# Patient Record
Sex: FEMALE | Race: WHITE
[De-identification: names, ages, dates, MRNs, and addresses within clinical notes are randomized per-mention and may not be internally consistent; named-entity substitution may affect disease eponyms.]

---

## 2021-09-01 ENCOUNTER — OFFICE VISIT (OUTPATIENT)
Age: 76
End: 2021-09-01

## 2021-10-01 ENCOUNTER — OFFICE VISIT (OUTPATIENT)
Age: 76
End: 2021-10-01

## 2021-10-21 ENCOUNTER — OFFICE VISIT (OUTPATIENT)
Dept: URBAN - METROPOLITAN AREA CLINIC 7 | Facility: CLINIC | Age: 76
End: 2021-10-21

## 2021-11-19 ENCOUNTER — OFFICE VISIT (OUTPATIENT)
Dept: URBAN - METROPOLITAN AREA CLINIC 7 | Facility: CLINIC | Age: 76
End: 2021-11-19

## 2021-11-19 ENCOUNTER — TELEPHONE ENCOUNTER (OUTPATIENT)
Dept: URBAN - METROPOLITAN AREA CLINIC 9 | Facility: CLINIC | Age: 76
End: 2021-11-19

## 2021-12-03 ENCOUNTER — TELEPHONE ENCOUNTER (OUTPATIENT)
Dept: URBAN - METROPOLITAN AREA CLINIC 9 | Facility: CLINIC | Age: 76
End: 2021-12-03

## 2022-01-04 ENCOUNTER — OFFICE VISIT (OUTPATIENT)
Dept: URBAN - METROPOLITAN AREA SURGERY CENTER 5 | Facility: SURGERY CENTER | Age: 77
End: 2022-01-04

## 2022-06-14 ENCOUNTER — OFFICE VISIT (OUTPATIENT)
Dept: URBAN - METROPOLITAN AREA CLINIC 7 | Facility: CLINIC | Age: 77
End: 2022-06-14

## 2022-06-27 ENCOUNTER — TELEPHONE ENCOUNTER (OUTPATIENT)
Dept: URBAN - METROPOLITAN AREA CLINIC 9 | Facility: CLINIC | Age: 77
End: 2022-06-27

## 2022-07-06 ENCOUNTER — TELEPHONE ENCOUNTER (OUTPATIENT)
Dept: URBAN - METROPOLITAN AREA CLINIC 9 | Facility: CLINIC | Age: 77
End: 2022-07-06

## 2022-07-08 ENCOUNTER — OFFICE VISIT (OUTPATIENT)
Dept: URBAN - METROPOLITAN AREA SURGERY CENTER 5 | Facility: SURGERY CENTER | Age: 77
End: 2022-07-08

## 2022-07-09 ENCOUNTER — TELEPHONE ENCOUNTER (OUTPATIENT)
Dept: URBAN - METROPOLITAN AREA CLINIC 121 | Facility: CLINIC | Age: 77
End: 2022-07-09

## 2022-07-10 ENCOUNTER — TELEPHONE ENCOUNTER (OUTPATIENT)
Dept: URBAN - METROPOLITAN AREA CLINIC 121 | Facility: CLINIC | Age: 77
End: 2022-07-10

## 2022-07-30 ENCOUNTER — TELEPHONE ENCOUNTER (OUTPATIENT)
Age: 77
End: 2022-07-30

## 2022-07-30 RX ORDER — MESALAMINE 1.2 G/1
3 (THREE) TABLET DR TABLET, DELAYED RELEASE ORAL DAILY
Qty: 0 | Refills: 5 | OUTPATIENT
Start: 2016-03-03 | End: 2017-01-04

## 2022-07-30 RX ORDER — BUDESONIDE 3 MG/1
1 CAPSULE, COATED PELLETS ORAL
Qty: 0 | Refills: 2 | OUTPATIENT
Start: 2020-04-01 | End: 2020-05-27

## 2022-07-30 RX ORDER — MESALAMINE 800 MG/1
1 (ONE) TABLET, DELAYED RELEASE ORAL
Qty: 0 | Refills: 16 | OUTPATIENT
Start: 2012-06-25 | End: 2012-06-26

## 2022-07-30 RX ORDER — FAMOTIDINE 10 MG
1 TABLET ORAL
Qty: 0 | Refills: 5 | OUTPATIENT
Start: 2018-12-10 | End: 2020-01-16

## 2022-07-31 ENCOUNTER — TELEPHONE ENCOUNTER (OUTPATIENT)
Age: 77
End: 2022-07-31

## 2022-07-31 RX ORDER — FAMOTIDINE 10 MG
1 (ONE) TABLET ORAL
Qty: 0 | Refills: 12 | Status: ACTIVE | COMMUNITY
Start: 2017-02-02

## 2022-07-31 RX ORDER — FAMOTIDINE 10 MG
1 (ONE) TABLET ORAL
Qty: 0 | Refills: 2 | Status: ACTIVE | COMMUNITY
Start: 2017-01-04

## 2022-07-31 RX ORDER — FAMOTIDINE 10 MG
1 TABLET ORAL
Qty: 0 | Refills: 5 | Status: ACTIVE | COMMUNITY
Start: 2018-12-10

## 2022-07-31 RX ORDER — BUDESONIDE 3 MG/1
1 CAPSULE, COATED PELLETS ORAL
Qty: 0 | Refills: 2 | Status: ACTIVE | COMMUNITY
Start: 2020-04-01

## 2022-07-31 RX ORDER — MESALAMINE 800 MG/1
1 (ONE) TABLET, DELAYED RELEASE ORAL
Qty: 0 | Refills: 16 | Status: ACTIVE | COMMUNITY
Start: 2012-06-25

## 2022-07-31 RX ORDER — FAMOTIDINE 10 MG
1 (ONE) TABLET ORAL
Qty: 0 | Refills: 2 | Status: ACTIVE | COMMUNITY
Start: 2018-12-03

## 2022-07-31 RX ORDER — FAMOTIDINE 10 MG
1 (ONE) TABLET ORAL
Qty: 0 | Refills: 2 | Status: ACTIVE | COMMUNITY
Start: 2018-11-08

## 2022-07-31 RX ORDER — BISMUTH SUBSALICYLATE 262 MG/1
1 (ONE) TABLET, CHEWABLE ORAL
Qty: 0 | Refills: 5 | Status: ACTIVE | COMMUNITY
Start: 2020-04-09

## 2022-07-31 RX ORDER — FAMOTIDINE 10 MG
1 (ONE) TABLET ORAL
Qty: 0 | Refills: 2 | Status: ACTIVE | COMMUNITY
Start: 2018-12-04

## 2022-07-31 RX ORDER — FAMOTIDINE 10 MG
1 (ONE) TABLET ORAL
Qty: 0 | Refills: 4 | Status: ACTIVE | COMMUNITY
Start: 2018-02-19

## 2022-07-31 RX ORDER — MESALAMINE 1.2 G/1
3 (THREE) TABLET, DELAYED RELEASE ORAL DAILY
Qty: 0 | Refills: 5 | Status: ACTIVE | COMMUNITY
Start: 2016-03-03

## 2022-08-16 ENCOUNTER — OFFICE VISIT (OUTPATIENT)
Dept: URBAN - METROPOLITAN AREA SURGERY CENTER 5 | Facility: SURGERY CENTER | Age: 77
End: 2022-08-16
Payer: MEDICARE

## 2022-08-16 ENCOUNTER — CLAIMS CREATED FROM THE CLAIM WINDOW (OUTPATIENT)
Dept: URBAN - METROPOLITAN AREA CLINIC 8 | Facility: CLINIC | Age: 77
End: 2022-08-16
Payer: MEDICARE

## 2022-08-16 DIAGNOSIS — R19.7 ACUTE DIARRHEA: ICD-10-CM

## 2022-08-16 DIAGNOSIS — K64.0 BLEEDING GRADE I HEMORRHOIDS: ICD-10-CM

## 2022-08-16 DIAGNOSIS — K63.5 BENIGN COLON POLYP: ICD-10-CM

## 2022-08-16 DIAGNOSIS — K52.831 CC (COLLAGENOUS COLITIS): ICD-10-CM

## 2022-08-16 PROCEDURE — 88305 TISSUE EXAM BY PATHOLOGIST: CPT | Performed by: INTERNAL MEDICINE

## 2022-08-16 PROCEDURE — 45380 COLONOSCOPY AND BIOPSY: CPT | Performed by: CLINIC/CENTER

## 2022-08-16 PROCEDURE — 45385 COLONOSCOPY W/LESION REMOVAL: CPT | Performed by: CLINIC/CENTER

## 2022-08-16 PROCEDURE — 45385 COLONOSCOPY W/LESION REMOVAL: CPT | Performed by: INTERNAL MEDICINE

## 2022-08-16 PROCEDURE — 45380 COLONOSCOPY AND BIOPSY: CPT | Performed by: INTERNAL MEDICINE

## 2022-09-26 ENCOUNTER — TELEPHONE ENCOUNTER (OUTPATIENT)
Dept: URBAN - METROPOLITAN AREA CLINIC 7 | Facility: CLINIC | Age: 77
End: 2022-09-26

## 2022-11-04 ENCOUNTER — OFFICE VISIT (OUTPATIENT)
Dept: URBAN - METROPOLITAN AREA CLINIC 7 | Facility: CLINIC | Age: 77
End: 2022-11-04
Payer: MEDICARE

## 2022-11-04 VITALS
DIASTOLIC BLOOD PRESSURE: 82 MMHG | WEIGHT: 109 LBS | TEMPERATURE: 97.7 F | BODY MASS INDEX: 20.06 KG/M2 | HEIGHT: 62 IN | SYSTOLIC BLOOD PRESSURE: 128 MMHG | RESPIRATION RATE: 16 BRPM

## 2022-11-04 DIAGNOSIS — R19.7 DIARRHEA, UNSPECIFIED TYPE: ICD-10-CM

## 2022-11-04 PROCEDURE — 99213 OFFICE O/P EST LOW 20 MIN: CPT | Performed by: STUDENT IN AN ORGANIZED HEALTH CARE EDUCATION/TRAINING PROGRAM

## 2022-11-04 RX ORDER — FLUTICASONE PROPIONATE 50 UG/1
SHAKE LIQUID AND USE 1 SPRAY IN EACH NOSTRIL EVERY DAY SPRAY, METERED NASAL
Qty: 16 GRAM | Refills: 0 | Status: ACTIVE | COMMUNITY

## 2022-11-04 RX ORDER — CITALOPRAM HYDROBROMIDE 10 MG/1
TAKE 1 TABLET BY MOUTH EVERY DAY TABLET ORAL
Qty: 30 EACH | Refills: 0 | Status: ACTIVE | COMMUNITY

## 2022-11-04 RX ORDER — BISMUTH SUBSALICYLATE 262 MG/1
1 (ONE) TABLET, CHEWABLE ORAL
Qty: 0 | Refills: 5 | Status: DISCONTINUED | COMMUNITY
Start: 2020-04-09

## 2022-11-04 RX ORDER — MESALAMINE 1.2 G/1
3 (THREE) TABLET, DELAYED RELEASE ORAL DAILY
Qty: 0 | Refills: 5 | Status: DISCONTINUED | COMMUNITY
Start: 2016-03-03

## 2022-11-04 RX ORDER — CHOLESTYRAMINE 4 G/9G
1 PACKET MIXED WITH WATER OR NON-CARBONATED DRINK POWDER, FOR SUSPENSION ORAL ONCE A DAY
Qty: 30 | Refills: 2 | OUTPATIENT
Start: 2022-11-04

## 2022-11-04 RX ORDER — INFLIXIMAB 100 MG/10ML
AS DIRECTED INJECTION, POWDER, LYOPHILIZED, FOR SOLUTION INTRAVENOUS
Status: ACTIVE | COMMUNITY
Start: 2022-11-04

## 2022-11-04 RX ORDER — VALACYCLOVIR HYDROCHLORIDE 500 MG/1
TABLET, FILM COATED ORAL
Qty: 30 TABLET | Status: ACTIVE | COMMUNITY

## 2022-11-04 RX ORDER — BUDESONIDE 3 MG/1
1 CAPSULE, COATED PELLETS ORAL
Qty: 0 | Refills: 2 | Status: DISCONTINUED | COMMUNITY
Start: 2020-04-01

## 2022-11-04 RX ORDER — MESALAMINE 800 MG/1
1 (ONE) TABLET, DELAYED RELEASE ORAL
Qty: 0 | Refills: 16 | Status: DISCONTINUED | COMMUNITY
Start: 2012-06-25

## 2022-11-04 RX ORDER — FAMOTIDINE 10 MG
1 (ONE) TABLET ORAL
Qty: 0 | Refills: 2 | Status: DISCONTINUED | COMMUNITY
Start: 2018-12-04

## 2022-11-04 NOTE — HPI-TODAY'S VISIT:
Patient has a history of microscopic colitis (dx in 2012), psoriatic arthritis on remicade, who presents for follow up.  GI Hx: Still having non bloody diarrhea (up to 4-5x/day) and is taking antidiarrheals PRN to perform her normal daily lifestyle. Is taking alot of supplements/vitamins. No NSAIDs. Denies weight loss, fever, chills, abdominal pain, nausea, vomiting. Denies dysphagia, reflux, UGI symptoms. Denies hematemesis, melena, hematochezia, blood per rectum.  EGD: 1/4/22- z line irregular (bx: minimal chronic inflam, neg IM/dys), mild schatskis ring DIL to 45 Fr, gastritis (bx: mild acute on chronic infl, neg HP), small HH, normal duodenum 2017- gastritis (bx: non specific infl). 2016- irregular stomach lining (bx distal and proximal- chronic gastirtis) EUS 2015- normal. 2015- congested erythematous and nodular mucosa of gastric body and antrum (bx: moderate chronic inflammation with focal ulceration and ischemic change) 2015- gastritis (bx from antrum/body/fundus: chronic active gastritis)  Colonoscopy: 8/16/22- 3mm polyp in desc (bx: HP), random colon bx normal. IH. 3/2020- EH, IH, sigmoid and desc divertic, random colon bx normal. 2017- IH, random colon bx normal. 2012- IH, random colon bx consistent with collagneous colitis.  Imaging/Studies/Procedures: 6/2022- labs- c diff negative, elastase 771, O/P normal, TTG normal. TSH normal.

## 2022-11-22 ENCOUNTER — TELEPHONE ENCOUNTER (OUTPATIENT)
Dept: URBAN - METROPOLITAN AREA CLINIC 7 | Facility: CLINIC | Age: 77
End: 2022-11-22

## 2022-12-16 ENCOUNTER — OFFICE VISIT (OUTPATIENT)
Dept: URBAN - METROPOLITAN AREA CLINIC 7 | Facility: CLINIC | Age: 77
End: 2022-12-16
Payer: MEDICARE

## 2022-12-16 VITALS
BODY MASS INDEX: 20.24 KG/M2 | DIASTOLIC BLOOD PRESSURE: 74 MMHG | SYSTOLIC BLOOD PRESSURE: 128 MMHG | TEMPERATURE: 97.8 F | WEIGHT: 110 LBS | RESPIRATION RATE: 16 BRPM | HEIGHT: 62 IN

## 2022-12-16 DIAGNOSIS — R19.7 DIARRHEA, UNSPECIFIED TYPE: ICD-10-CM

## 2022-12-16 DIAGNOSIS — R19.5 ELEVATED FECAL CALPROTECTIN: ICD-10-CM

## 2022-12-16 PROCEDURE — 99213 OFFICE O/P EST LOW 20 MIN: CPT | Performed by: STUDENT IN AN ORGANIZED HEALTH CARE EDUCATION/TRAINING PROGRAM

## 2022-12-16 RX ORDER — INFLIXIMAB 100 MG/10ML
AS DIRECTED INJECTION, POWDER, LYOPHILIZED, FOR SOLUTION INTRAVENOUS
Status: ACTIVE | COMMUNITY
Start: 2022-11-04

## 2022-12-16 RX ORDER — VALACYCLOVIR HYDROCHLORIDE 500 MG/1
TABLET, FILM COATED ORAL
Qty: 30 TABLET | Status: ACTIVE | COMMUNITY

## 2022-12-16 RX ORDER — PSYLLIUM SEED (WITH DEXTROSE)
1 PACKET WITH 8 OUNCES OF LIQUID AS NEEDED POWDER (GRAM) ORAL TWICE A DAY
Qty: 60 | Refills: 0 | OUTPATIENT
Start: 2022-12-16 | End: 2023-01-15

## 2022-12-16 RX ORDER — CHOLESTYRAMINE 4 G/9G
1 PACKET MIXED WITH WATER OR NON-CARBONATED DRINK POWDER, FOR SUSPENSION ORAL TWICE A DAY
Qty: 60 | Refills: 2 | OUTPATIENT
Start: 2022-11-04

## 2022-12-16 RX ORDER — CHOLESTYRAMINE 4 G/9G
1 PACKET MIXED WITH WATER OR NON-CARBONATED DRINK POWDER, FOR SUSPENSION ORAL ONCE A DAY
Qty: 30 | Refills: 2 | Status: ACTIVE | COMMUNITY
Start: 2022-11-04

## 2022-12-16 RX ORDER — CITALOPRAM HYDROBROMIDE 10 MG/1
TAKE 1 TABLET BY MOUTH EVERY DAY TABLET ORAL
Qty: 30 EACH | Refills: 0 | Status: ACTIVE | COMMUNITY

## 2022-12-16 RX ORDER — FLUTICASONE PROPIONATE 50 UG/1
SHAKE LIQUID AND USE 1 SPRAY IN EACH NOSTRIL EVERY DAY SPRAY, METERED NASAL
Qty: 16 GRAM | Refills: 0 | Status: ACTIVE | COMMUNITY

## 2022-12-16 NOTE — HPI-TODAY'S VISIT:
Patient has a history of microscopic colitis (dx in 2012), psoriatic arthritis on remicade, who presents for follow up.  Rheum- Damian Simeon MD  GI Hx: 11/2022- Still having non bloody diarrhea (up to 4-5x/day) and is taking antidiarrheals PRN to perform her normal daily lifestyle. Is taking alot of supplements/vitamins. No NSAIDs.  12/16/22- Has slight resolution of diarrhea with metamucil and cholestyramine. Taking metamucil gummies once a day. Pending: B12, folate, Fe, TIBC, Ferritin, Vit D, Consider MRE/CTE/VCE. Crohns vs. Microscopic colitis might be partially treated given on remicade for psoriatic arthritis.  Denies weight loss, fever, chills, abdominal pain, nausea, vomiting. Denies dysphagia, reflux, UGI symptoms. Denies hematemesis, melena, hematochezia, blood per rectum.  EGD: 1/4/22- z line irregular (bx: minimal chronic inflam, neg IM/dys), mild schatskis ring DIL to 45 Fr, gastritis (bx: mild acute on chronic infl, neg HP), small HH, normal duodenum 2017- gastritis (bx: non specific infl). 2016- irregular stomach lining (bx distal and proximal- chronic gastirtis) EUS 2015- normal. 2015- congested erythematous and nodular mucosa of gastric body and antrum (bx: moderate chronic inflammation with focal ulceration and ischemic change) 2015- gastritis (bx from antrum/body/fundus: chronic active gastritis)  Colonoscopy: 8/16/22- 3mm polyp in desc (bx: HP), random colon bx normal. IH. 3/2020- EH, IH, sigmoid and desc divertic, random colon bx normal. 2017- IH, random colon bx normal. 2012- IH, random colon bx consistent with collagneous colitis.  Imaging/Studies/Procedures: 6/2022- labs- c diff negative, elastase 771, O/P normal, TTG normal. TSH normal. 11/10/22- labs- celiac neg, calprotectin 60.8 (borderline high), crypto (cancelled- wrong container). ***Pending: B12, folate, Fe, TIBC, Ferritin, Vit D

## 2022-12-19 ENCOUNTER — TELEPHONE ENCOUNTER (OUTPATIENT)
Dept: URBAN - METROPOLITAN AREA CLINIC 7 | Facility: CLINIC | Age: 77
End: 2022-12-19

## 2023-01-23 ENCOUNTER — TELEPHONE ENCOUNTER (OUTPATIENT)
Dept: URBAN - METROPOLITAN AREA CLINIC 7 | Facility: CLINIC | Age: 78
End: 2023-01-23

## 2023-10-12 ENCOUNTER — TELEPHONE ENCOUNTER (OUTPATIENT)
Dept: URBAN - METROPOLITAN AREA CLINIC 7 | Facility: CLINIC | Age: 78
End: 2023-10-12

## 2024-01-11 ENCOUNTER — OFFICE VISIT (OUTPATIENT)
Dept: URBAN - METROPOLITAN AREA CLINIC 9 | Facility: CLINIC | Age: 79
End: 2024-01-11
Payer: MEDICARE

## 2024-01-11 ENCOUNTER — DASHBOARD ENCOUNTERS (OUTPATIENT)
Age: 79
End: 2024-01-11

## 2024-01-11 VITALS
HEIGHT: 62 IN | WEIGHT: 112 LBS | DIASTOLIC BLOOD PRESSURE: 85 MMHG | SYSTOLIC BLOOD PRESSURE: 124 MMHG | BODY MASS INDEX: 20.61 KG/M2

## 2024-01-11 DIAGNOSIS — R19.7 DIARRHEA, UNSPECIFIED TYPE: ICD-10-CM

## 2024-01-11 DIAGNOSIS — R89.9 ABNORMAL LABORATORY TEST: ICD-10-CM

## 2024-01-11 DIAGNOSIS — R19.5 ELEVATED FECAL CALPROTECTIN: ICD-10-CM

## 2024-01-11 DIAGNOSIS — K90.89 BILE ACID MALABSORPTION SYNDROME: ICD-10-CM

## 2024-01-11 PROBLEM — 44332000: Status: ACTIVE | Noted: 2024-01-11

## 2024-01-11 PROCEDURE — 99214 OFFICE O/P EST MOD 30 MIN: CPT | Performed by: STUDENT IN AN ORGANIZED HEALTH CARE EDUCATION/TRAINING PROGRAM

## 2024-01-11 RX ORDER — COLESTIPOL HYDROCHLORIDE 1 G/1
2 TABLETS TABLET, FILM COATED ORAL ONCE A DAY
Qty: 60 | Refills: 5 | OUTPATIENT
Start: 2024-01-11

## 2024-01-11 RX ORDER — CHOLESTYRAMINE 4 G/9G
1 PACKET MIXED WITH WATER OR NON-CARBONATED DRINK POWDER, FOR SUSPENSION ORAL ONCE A DAY
Refills: 2 | COMMUNITY
Start: 2022-11-04

## 2024-01-11 RX ORDER — FLUTICASONE PROPIONATE 50 UG/1
SHAKE LIQUID AND USE 1 SPRAY IN EACH NOSTRIL EVERY DAY SPRAY, METERED NASAL
Qty: 16 GRAM | Refills: 0 | Status: ACTIVE | COMMUNITY

## 2024-01-11 RX ORDER — INFLIXIMAB 100 MG/10ML
AS DIRECTED INJECTION, POWDER, LYOPHILIZED, FOR SOLUTION INTRAVENOUS
COMMUNITY
Start: 2022-11-04

## 2024-01-11 RX ORDER — VALACYCLOVIR HYDROCHLORIDE 500 MG/1
1 TABLET TABLET, FILM COATED ORAL EVERY OTHER DAY
COMMUNITY

## 2024-01-11 RX ORDER — CITALOPRAM HYDROBROMIDE 10 MG/1
TAKE 1 TABLET BY MOUTH EVERY DAY TABLET ORAL
Qty: 30 EACH | Refills: 0 | Status: ACTIVE | COMMUNITY

## 2024-01-11 RX ORDER — ASPIRIN 81 MG/1
1 TABLET TABLET, COATED ORAL ONCE A DAY
Status: ACTIVE | COMMUNITY

## 2024-01-11 RX ORDER — CHOLESTYRAMINE 4 G/9G
1 PACKET MIXED WITH WATER OR NON-CARBONATED DRINK POWDER, FOR SUSPENSION ORAL ONCE A DAY
OUTPATIENT
Start: 2022-11-04

## 2024-01-11 RX ORDER — LOSARTAN POTASSIUM 25 MG/1
1 TABLET TABLET, FILM COATED ORAL ONCE A DAY
Status: ACTIVE | COMMUNITY

## 2024-01-11 NOTE — HPI-TODAY'S VISIT:
Patient has a history of microscopic colitis (dx in 2012 without recurrence since then on multiple colonsocopies), psoriatic arthritis on remicade, who presents for follow up.  Rheum- Damian Simeon MD  GI Hx: 11/2022- Still having non bloody diarrhea (up to 4-5x/day) and is taking antidiarrheals PRN to perform her normal daily lifestyle. Is taking alot of supplements/vitamins. No NSAIDs. 12/16/22- Has slight resolution of diarrhea with metamucil and cholestyramine. Taking metamucil gummies once a day. Pending: B12, folate, Fe, TIBC, Ferritin, Vit D, Consider MRE/CTE/VCE. Crohns vs. Microscopic colitis might be partially treated given on remicade for psoriatic arthritis. 1/11/24- responded well to cholestyramine but did not tolerated the granules. Is asking for the pill version because she had good response. Still having diarrhea. Reviewed recent labs including the ones ordered from Dr. Simeon.  EGD: 2015- Dr. Travis- gastritis (bx from antrum/body/fundus: chronic active gastritis) 2015- Dr. Travis- congested erythematous and nodular mucosa of gastric body and antrum (bx: moderate chronic inflammation with focal ulceration and ischemic change) 2016- Dr. Travis- irregular stomach lining (bx distal and proximal- chronic gastirtis) 2017- Dr. Travis- gastritis (bx: non specific infl). 1/4/22- Dr. Travis- z line irregular (bx: minimal chronic inflam, neg IM/dys), mild schatskis ring DIL to 45 Fr, gastritis (bx: mild acute on chronic infl, neg HP), small HH, normal duodenum  EUS: 2015- Dr. Travis- normal.  Colonoscopy: 2012- Dr. Travis- IH, random colon bx consistent with collagneous colitis. 2017- Dr. Travis- IH, random colon bx normal. 3/2020- Dr. Travis- EH, IH, sigmoid and desc divertic, random colon bx normal. 8/16/22- Dr. Travis- 3mm polyp in desc (bx: HP), random colon bx normal. IH.  Imaging/Studies/Procedures: 6/2022- c diff negative, elastase 771, O/P normal, TTG normal. TSH normal. 11/10/22- celiac test neg, calprotectin 60.8 (borderline high), crypto (cancelled- wrong container). MRE 1/9/23- normal. 11/2023- CBC (Hgb 11.6, plt 509), CMP, B12, folate, Vit D normal. Quantiferon indeterminate. Fe, TIBC, Ferritin, 11/21/23- infliximab level 1.6, Ab high*

## 2024-01-15 PROBLEM — 165346000: Status: ACTIVE | Noted: 2024-01-15

## 2024-03-20 ENCOUNTER — LAB (OUTPATIENT)
Dept: URBAN - METROPOLITAN AREA CLINIC 9 | Facility: CLINIC | Age: 79
End: 2024-03-20

## 2024-04-01 ENCOUNTER — LAB (OUTPATIENT)
Dept: URBAN - METROPOLITAN AREA CLINIC 4 | Facility: CLINIC | Age: 79
End: 2024-04-01
Payer: MEDICARE

## 2024-04-01 ENCOUNTER — EGD (OUTPATIENT)
Dept: URBAN - METROPOLITAN AREA SURGERY CENTER 9 | Facility: SURGERY CENTER | Age: 79
End: 2024-04-01
Payer: MEDICARE

## 2024-04-01 DIAGNOSIS — K31.A0 GASTRIC INTESTINAL METAPLASIA, UNSPECIFIED: ICD-10-CM

## 2024-04-01 DIAGNOSIS — K29.70 GASTRITIS WITHOUT BLEEDING, UNSPECIFIED CHRONICITY, UNSPECIFIED GASTRITIS TYPE: ICD-10-CM

## 2024-04-01 DIAGNOSIS — K31.89 OTHER DISEASES OF STOMACH AND DUODENUM: ICD-10-CM

## 2024-04-01 DIAGNOSIS — K22.89 OTHER SPECIFIED DISEASE OF ESOPHAGUS: ICD-10-CM

## 2024-04-01 DIAGNOSIS — K29.70 GASTRITIS, UNSPECIFIED, WITHOUT BLEEDING: ICD-10-CM

## 2024-04-01 DIAGNOSIS — K44.9 DIAPHRAGMATIC HERNIA WITHOUT OBSTRUCTION OR GANGRENE: ICD-10-CM

## 2024-04-01 PROCEDURE — 88341 IMHCHEM/IMCYTCHM EA ADD ANTB: CPT | Performed by: PATHOLOGY

## 2024-04-01 PROCEDURE — 88342 IMHCHEM/IMCYTCHM 1ST ANTB: CPT | Performed by: PATHOLOGY

## 2024-04-01 PROCEDURE — 88305 TISSUE EXAM BY PATHOLOGIST: CPT | Performed by: PATHOLOGY

## 2024-04-01 PROCEDURE — 43239 EGD BIOPSY SINGLE/MULTIPLE: CPT | Performed by: STUDENT IN AN ORGANIZED HEALTH CARE EDUCATION/TRAINING PROGRAM

## 2024-04-01 RX ORDER — FLUTICASONE PROPIONATE 50 UG/1
SHAKE LIQUID AND USE 1 SPRAY IN EACH NOSTRIL EVERY DAY SPRAY, METERED NASAL
Qty: 16 GRAM | Refills: 0 | Status: ACTIVE | COMMUNITY

## 2024-04-01 RX ORDER — LOSARTAN POTASSIUM 25 MG/1
1 TABLET TABLET, FILM COATED ORAL ONCE A DAY
Status: ACTIVE | COMMUNITY

## 2024-04-01 RX ORDER — CITALOPRAM HYDROBROMIDE 10 MG/1
TAKE 1 TABLET BY MOUTH EVERY DAY TABLET ORAL
Qty: 30 EACH | Refills: 0 | Status: ACTIVE | COMMUNITY

## 2024-04-01 RX ORDER — INFLIXIMAB 100 MG/10ML
AS DIRECTED INJECTION, POWDER, LYOPHILIZED, FOR SOLUTION INTRAVENOUS
COMMUNITY
Start: 2022-11-04

## 2024-04-01 RX ORDER — COLESTIPOL HYDROCHLORIDE 1 G/1
2 TABLETS TABLET, FILM COATED ORAL ONCE A DAY
Qty: 60 | Refills: 5 | Status: ACTIVE | COMMUNITY
Start: 2024-01-11

## 2024-04-01 RX ORDER — ASPIRIN 81 MG/1
1 TABLET TABLET, COATED ORAL ONCE A DAY
Status: ACTIVE | COMMUNITY

## 2024-04-01 RX ORDER — VALACYCLOVIR HYDROCHLORIDE 500 MG/1
1 TABLET TABLET, FILM COATED ORAL EVERY OTHER DAY
COMMUNITY

## 2024-05-01 ENCOUNTER — OFFICE VISIT (OUTPATIENT)
Dept: URBAN - METROPOLITAN AREA CLINIC 9 | Facility: CLINIC | Age: 79
End: 2024-05-01

## 2024-05-22 ENCOUNTER — TELEPHONE ENCOUNTER (OUTPATIENT)
Dept: URBAN - METROPOLITAN AREA CLINIC 9 | Facility: CLINIC | Age: 79
End: 2024-05-22

## 2024-05-22 ENCOUNTER — OFFICE VISIT (OUTPATIENT)
Dept: URBAN - METROPOLITAN AREA CLINIC 9 | Facility: CLINIC | Age: 79
End: 2024-05-22

## 2024-05-29 ENCOUNTER — OFFICE VISIT (OUTPATIENT)
Dept: URBAN - METROPOLITAN AREA CLINIC 9 | Facility: CLINIC | Age: 79
End: 2024-05-29

## 2024-11-05 ENCOUNTER — ERX REFILL RESPONSE (OUTPATIENT)
Dept: URBAN - METROPOLITAN AREA CLINIC 9 | Facility: CLINIC | Age: 79
End: 2024-11-05

## 2024-11-05 RX ORDER — COLESTIPOL HYDROCHLORIDE 1 G/1
2 TABLETS TABLET, FILM COATED ORAL ONCE A DAY
Qty: 180 TABLET | Refills: 0 | OUTPATIENT

## 2024-11-05 RX ORDER — COLESTIPOL HYDROCHLORIDE 1 G/1
2 TABLETS TABLET, FILM COATED ORAL ONCE A DAY
Qty: 60 | Refills: 5 | OUTPATIENT

## 2025-03-10 ENCOUNTER — OFFICE VISIT (OUTPATIENT)
Dept: URBAN - METROPOLITAN AREA CLINIC 9 | Facility: CLINIC | Age: 80
End: 2025-03-10
Payer: MEDICARE

## 2025-03-10 VITALS
WEIGHT: 113 LBS | HEIGHT: 62 IN | BODY MASS INDEX: 20.8 KG/M2 | DIASTOLIC BLOOD PRESSURE: 72 MMHG | SYSTOLIC BLOOD PRESSURE: 130 MMHG

## 2025-03-10 DIAGNOSIS — K21.9 ACID REFLUX: ICD-10-CM

## 2025-03-10 DIAGNOSIS — R19.7 ACUTE DIARRHEA: ICD-10-CM

## 2025-03-10 PROCEDURE — 99213 OFFICE O/P EST LOW 20 MIN: CPT | Performed by: PHYSICIAN ASSISTANT

## 2025-03-10 RX ORDER — COLESTIPOL HYDROCHLORIDE 1 G/1
2 TABLETS TABLET, FILM COATED ORAL ONCE A DAY
Qty: 180 TABLET | Refills: 3

## 2025-03-10 RX ORDER — INFLIXIMAB 100 MG/10ML
AS DIRECTED INJECTION, POWDER, LYOPHILIZED, FOR SOLUTION INTRAVENOUS
COMMUNITY
Start: 2022-11-04

## 2025-03-10 RX ORDER — ASPIRIN 81 MG/1
1 TABLET TABLET, COATED ORAL ONCE A DAY
Status: ACTIVE | COMMUNITY

## 2025-03-10 RX ORDER — VALACYCLOVIR HYDROCHLORIDE 500 MG/1
1 TABLET TABLET, FILM COATED ORAL EVERY OTHER DAY
COMMUNITY

## 2025-03-10 RX ORDER — FLUTICASONE PROPIONATE 50 UG/1
SHAKE LIQUID AND USE 1 SPRAY IN EACH NOSTRIL EVERY DAY SPRAY, METERED NASAL
Qty: 16 GRAM | Refills: 0 | Status: ACTIVE | COMMUNITY

## 2025-03-10 RX ORDER — CITALOPRAM HYDROBROMIDE 10 MG/1
TAKE 1 TABLET BY MOUTH EVERY DAY TABLET ORAL
Qty: 30 EACH | Refills: 0 | Status: ACTIVE | COMMUNITY

## 2025-03-10 RX ORDER — VALACYCLOVIR 500 MG/1
TAKE ONE TABLET EVERY DAY BY MOUTH TABLET, FILM COATED ORAL
Qty: 30 EACH | Refills: 0 | Status: ACTIVE | COMMUNITY

## 2025-03-10 RX ORDER — COLESTIPOL HYDROCHLORIDE 1 G/1
2 TABLETS TABLET, FILM COATED ORAL ONCE A DAY
Qty: 180 TABLET | Refills: 0 | Status: ACTIVE | COMMUNITY

## 2025-03-10 RX ORDER — LOSARTAN POTASSIUM 25 MG/1
1 TABLET TABLET, FILM COATED ORAL ONCE A DAY
Status: ACTIVE | COMMUNITY

## 2025-03-10 NOTE — HPI-TODAY'S VISIT:
Patient, who initally saw Dr. Travis, then Dr. Carr, with psoriatic arthritis previously on Remicaide, as well as hx microscopic colitis (dx in 2012 without recurrence since then on multiple colonsocopies).  She has now been off Remicade with no worsening of diarrhea.  Was told by Dr. Carr that she  did not have IBD.    ETOH - one glass of wine a day.    FH:  No GI cancers.   GI Hx: 11/2022- Still having non bloody diarrhea (up to 4-5x/day) and is taking antidiarrheals PRN to perform her normal daily lifestyle. Is taking alot of supplements/vitamins. No NSAIDs. 12/16/22- Has slight resolution of diarrhea with metamucil and cholestyramine. Taking metamucil gummies once a day. Pending: B12, folate, Fe, TIBC, Ferritin, Vit D, Consider MRE/CTE/VCE. Crohns vs. Microscopic colitis might be partially treated given on remicade for psoriatic arthritis. 1/11/24- responded well to cholestyramine but did not tolerated the granules. Is asking for the pill version because she had good response. Still having diarrhea. Reviewed recent labs including the ones ordered from Dr. Simeon.  EGD: 2015- Dr. Travis- gastritis (bx from antrum/body/fundus: chronic active gastritis) 2015- Dr. Travis- congested erythematous and nodular mucosa of gastric body and antrum (bx: moderate chronic inflammation with focal ulceration and ischemic change) 2016- Dr. Travis- irregular stomach lining (bx distal and proximal- chronic gastirtis) 2017- Dr. Travis- gastritis (bx: non specific infl). EGD 1/4/22- Dr. Travis- z line irregular (bx: minimal chronic inflam, neg IM/dys), mild schatskis ring DIL to 45 Fr, gastritis (bx: mild acute on chronic infl, neg HP), small HH, normal duodenum  EUS: 2015- Dr. Travis- normal.  Colonoscopy: 2012- Dr. Travis- IH, random colon bx consistent with collagneous colitis. 2017- Dr. Travis- IH, random colon bx normal. 3/2020- Dr. Travis- EH, IH, sigmoid and desc divertic, random colon bx normal. 8/16/22- Dr. Travis- 3mm polyp in desc (bx: HP), random colon bx normal. IH. EGD/2024-duodenal biopsy negative stomach biopsy chronic inactive gastritis, changes suggestive of treated H. pylori.  Esophageal biopsy with reflux type changes.   Imaging/Studies/Procedures: 6/2022- c diff negative, elastase 771, O/P normal, TTG normal. TSH normal. 11/10/22- celiac test neg, calprotectin 60.8 (borderline high), crypto (cancelled- wrong container). MRE 1/9/23- normal. 11/2023- CBC (Hgb 11.6, plt 509), CMP, B12, folate, Vit D normal. Quantiferon indeterminate. Fe, TIBC, Ferritin, 11/21/23- infliximab level 1.6, Ab high* [id number] - LFTs, CBC wnl.   Interim visit 3/10/25  Pt still having usually a loose stool after coffee. Rest of day, may or may not have a couple more stools and still a little loose.  No noctural diarrhea. No blood in stools, melena. No unientional weight loss.  No severe abd pain. No n/v. Would take a scoop of Benefiber at night. Increase colestipol to 2 pils q hs.  If not improved, will recheck a pancreatic elastase - last checked 3 yrs ago.

## 2025-03-11 ENCOUNTER — TELEPHONE ENCOUNTER (OUTPATIENT)
Dept: URBAN - METROPOLITAN AREA CLINIC 9 | Facility: CLINIC | Age: 80
End: 2025-03-11

## 2025-04-29 ENCOUNTER — OFFICE VISIT (OUTPATIENT)
Dept: URBAN - METROPOLITAN AREA CLINIC 9 | Facility: CLINIC | Age: 80
End: 2025-04-29
Payer: MEDICARE

## 2025-04-29 DIAGNOSIS — A04.8 POSITIVE H. PYLORI TEST: ICD-10-CM

## 2025-04-29 DIAGNOSIS — R19.7 DIARRHEA, UNSPECIFIED TYPE: ICD-10-CM

## 2025-04-29 PROCEDURE — 99214 OFFICE O/P EST MOD 30 MIN: CPT | Performed by: PHYSICIAN ASSISTANT

## 2025-04-29 RX ORDER — OMEPRAZOLE 40 MG/1
1 CAPSULE 1/2 TO 1 HOUR BEFORE MORNING MEAL CAPSULE, DELAYED RELEASE ORAL TWICE A DAY
Qty: 28 | Refills: 0 | OUTPATIENT
Start: 2025-04-29

## 2025-04-29 RX ORDER — METRONIDAZOLE 250 MG/1
1 TABLET TABLET ORAL
Qty: 56 TABLET | Refills: 0 | OUTPATIENT
Start: 2025-04-29 | End: 2025-05-13

## 2025-04-29 RX ORDER — VALACYCLOVIR HYDROCHLORIDE 500 MG/1
1 TABLET TABLET, FILM COATED ORAL EVERY OTHER DAY
COMMUNITY

## 2025-04-29 RX ORDER — LOSARTAN POTASSIUM 25 MG/1
1 TABLET TABLET, FILM COATED ORAL ONCE A DAY
Status: ACTIVE | COMMUNITY

## 2025-04-29 RX ORDER — COLESTIPOL HYDROCHLORIDE 1 G/1
2 TABLETS TABLET, FILM COATED ORAL ONCE A DAY
Qty: 180 TABLET | Refills: 3 | Status: ACTIVE | COMMUNITY

## 2025-04-29 RX ORDER — DOXYCYCLINE HYCLATE 100 MG/1
1 CAPSULE CAPSULE ORAL TWICE A DAY
Qty: 28 CAPSULE | Refills: 0 | OUTPATIENT
Start: 2025-04-29 | End: 2025-05-13

## 2025-04-29 RX ORDER — INFLIXIMAB 100 MG/10ML
AS DIRECTED INJECTION, POWDER, LYOPHILIZED, FOR SOLUTION INTRAVENOUS
COMMUNITY
Start: 2022-11-04

## 2025-04-29 RX ORDER — VALACYCLOVIR 500 MG/1
TAKE ONE TABLET EVERY DAY BY MOUTH TABLET, FILM COATED ORAL
Qty: 30 EACH | Refills: 0 | Status: ACTIVE | COMMUNITY

## 2025-04-29 RX ORDER — FLUTICASONE PROPIONATE 50 UG/1
SHAKE LIQUID AND USE 1 SPRAY IN EACH NOSTRIL EVERY DAY SPRAY, METERED NASAL
Qty: 16 GRAM | Refills: 0 | Status: ACTIVE | COMMUNITY

## 2025-04-29 RX ORDER — CITALOPRAM HYDROBROMIDE 10 MG/1
TAKE 1 TABLET BY MOUTH EVERY DAY TABLET ORAL
Qty: 30 EACH | Refills: 0 | Status: ACTIVE | COMMUNITY

## 2025-04-29 RX ORDER — ASPIRIN 81 MG/1
1 TABLET TABLET, COATED ORAL ONCE A DAY
Status: ACTIVE | COMMUNITY

## 2025-04-29 NOTE — HPI-TODAY'S VISIT:
Patient, who initally saw Dr. Travis, then Dr. Carr, with psoriatic arthritis previously on Remicaide, as well as hx microscopic colitis (dx in 2012 without recurrence since then on multiple colonsocopies).  She has now been off Remicade with no worsening of diarrhea.  Was told by Dr. Carr that she  did not have IBD.    ETOH - one glass of wine a day.    FH:  No GI cancers.   GI Hx: 11/2022- Still having non bloody diarrhea (up to 4-5x/day) and is taking antidiarrheals PRN to perform her normal daily lifestyle. Is taking alot of supplements/vitamins. No NSAIDs. 12/16/22- Has slight resolution of diarrhea with metamucil and cholestyramine. Taking metamucil gummies once a day. Pending: B12, folate, Fe, TIBC, Ferritin, Vit D, Consider MRE/CTE/VCE. Crohns vs. Microscopic colitis might be partially treated given on remicade for psoriatic arthritis. 1/11/24- responded well to cholestyramine but did not tolerated the granules. Is asking for the pill version because she had good response. Still having diarrhea. Reviewed recent labs including the ones ordered from Dr. Simeon.  EGD: 2015- Dr. Travis- gastritis (bx from antrum/body/fundus: chronic active gastritis) 2015- Dr. Travis- congested erythematous and nodular mucosa of gastric body and antrum (bx: moderate chronic inflammation with focal ulceration and ischemic change) 2016- Dr. Travis- irregular stomach lining (bx distal and proximal- chronic gastirtis) 2017- Dr. Travis- gastritis (bx: non specific infl). EGD 1/4/22- Dr. Travis- z line irregular (bx: minimal chronic inflam, neg IM/dys), mild schatskis ring DIL to 45 Fr, gastritis (bx: mild acute on chronic infl, neg HP), small HH, normal duodenum  EUS: 2015- Dr. Travis- normal.  Colonoscopy: 2012- Dr. Travis- IH, random colon bx consistent with collagneous colitis. 2017- Dr. Travis- IH, random colon bx normal. 3/2020- Dr. Travis- EH, IH, sigmoid and desc divertic, random colon bx normal. 8/16/22- Dr. Travis- 3mm polyp in desc (bx: HP), random colon bx normal. IH. EGD/2024-duodenal biopsy negative stomach biopsy chronic inactive gastritis, changes suggestive of treated H. pylori.  Esophageal biopsy with reflux type changes.   Imaging/Studies/Procedures: 6/2022- c diff negative, elastase 771, O/P normal, TTG normal. TSH normal. 11/10/22- celiac test neg, calprotectin 60.8 (borderline high), crypto (cancelled- wrong container). MRE 1/9/23- normal. 11/2023- CBC (Hgb 11.6, plt 509), CMP, B12, folate, Vit D normal. Quantiferon indeterminate. Fe, TIBC, Ferritin, 11/21/23- infliximab level 1.6, Ab high* 1/302025 - LFTs, CBC wnl.   Interim visit 3/10/25  Pt still having usually a loose stool after coffee. Rest of day, may or may not have a couple more stools and still a little loose.  No noctural diarrhea. No blood in stools, melena. No unientional weight loss.  No severe abd pain. No n/v. Would take a scoop of Benefiber at night. Increase colestipol to 2 pils q hs.  If not improved, will recheck a pancreatic elastase - last checked 3 yrs ago.  Interim visit 4/29/25 She has been found to have H pylori, thus will require tx.  Will send Rx over for metronidazole, doxy, bismuth, and omeprazole x 14 days. Diarrhea did not improve on colestipol or Benefiber.  Will check a fecal meng and a pancreatic eleastase.  Has psoriatic arthritis.  Has 2 loose stools in am, occ has a couple later in day, but this is rare. No blood, no mucus. No abdominal pain, n/v.   Takes pepcid for some indigestion and helps. RTC in 4 weeks, then will order stool ag for H. pylori.

## 2025-05-05 ENCOUNTER — TELEPHONE ENCOUNTER (OUTPATIENT)
Dept: URBAN - METROPOLITAN AREA CLINIC 9 | Facility: CLINIC | Age: 80
End: 2025-05-05

## 2025-05-06 ENCOUNTER — TELEPHONE ENCOUNTER (OUTPATIENT)
Dept: URBAN - METROPOLITAN AREA CLINIC 9 | Facility: CLINIC | Age: 80
End: 2025-05-06

## 2025-05-16 ENCOUNTER — TELEPHONE ENCOUNTER (OUTPATIENT)
Dept: URBAN - METROPOLITAN AREA CLINIC 9 | Facility: CLINIC | Age: 80
End: 2025-05-16

## 2025-07-01 ENCOUNTER — OFFICE VISIT (OUTPATIENT)
Dept: URBAN - METROPOLITAN AREA CLINIC 9 | Facility: CLINIC | Age: 80
End: 2025-07-01
Payer: MEDICARE

## 2025-07-01 DIAGNOSIS — Z86.19 HISTORY OF HELICOBACTER PYLORI INFECTION: ICD-10-CM

## 2025-07-01 DIAGNOSIS — K52.831 COLLAGENOUS COLITIS: ICD-10-CM

## 2025-07-01 PROCEDURE — 99213 OFFICE O/P EST LOW 20 MIN: CPT | Performed by: PHYSICIAN ASSISTANT

## 2025-07-01 RX ORDER — CITALOPRAM HYDROBROMIDE 10 MG/1
TAKE 1 TABLET BY MOUTH EVERY DAY TABLET ORAL
Qty: 30 EACH | Refills: 0 | Status: ACTIVE | COMMUNITY

## 2025-07-01 RX ORDER — FLUTICASONE PROPIONATE 50 UG/1
SHAKE LIQUID AND USE 1 SPRAY IN EACH NOSTRIL EVERY DAY SPRAY, METERED NASAL
Qty: 16 GRAM | Refills: 0 | Status: ACTIVE | COMMUNITY

## 2025-07-01 RX ORDER — LOSARTAN POTASSIUM 25 MG/1
1 TABLET TABLET, FILM COATED ORAL ONCE A DAY
Status: ACTIVE | COMMUNITY

## 2025-07-01 RX ORDER — OMEPRAZOLE 40 MG/1
1 CAPSULE 1/2 TO 1 HOUR BEFORE MORNING MEAL CAPSULE, DELAYED RELEASE ORAL TWICE A DAY
Qty: 28 | Refills: 0 | Status: ACTIVE | COMMUNITY
Start: 2025-04-29

## 2025-07-01 RX ORDER — VALACYCLOVIR 500 MG/1
TAKE ONE TABLET EVERY DAY BY MOUTH TABLET, FILM COATED ORAL
Qty: 30 EACH | Refills: 0 | Status: ACTIVE | COMMUNITY

## 2025-07-01 RX ORDER — INFLIXIMAB 100 MG/10ML
AS DIRECTED INJECTION, POWDER, LYOPHILIZED, FOR SOLUTION INTRAVENOUS
COMMUNITY
Start: 2022-11-04

## 2025-07-01 RX ORDER — COLESTIPOL HYDROCHLORIDE 1 G/1
2 TABLETS TABLET, FILM COATED ORAL ONCE A DAY
Qty: 180 TABLET | Refills: 3 | Status: ACTIVE | COMMUNITY

## 2025-07-01 RX ORDER — VALACYCLOVIR HYDROCHLORIDE 500 MG/1
1 TABLET TABLET, FILM COATED ORAL EVERY OTHER DAY
COMMUNITY

## 2025-07-01 RX ORDER — BUDESONIDE 3 MG/1
2 CAPSULES ONCE A DAY FOR 14 DAYS, 1 CAPSULE ONCE A DAY FOR 21 DAYS CAPSULE ORAL
Qty: 42 CAPSULE | OUTPATIENT
Start: 2025-07-01

## 2025-07-01 RX ORDER — ASPIRIN 81 MG/1
1 TABLET TABLET, COATED ORAL ONCE A DAY
Status: ACTIVE | COMMUNITY

## 2025-07-01 NOTE — HPI-TODAY'S VISIT:
Patient, who initally saw Dr. Travis, then Dr. Carr, with psoriatic arthritis previously on Remicaide, as well as hx microscopic colitis (dx in 2012 without recurrence since then on multiple colonsocopies).  She has now been off Remicade with no worsening of diarrhea.  Was told by Dr. Carr that she  did not have IBD.    ETOH - one glass of wine a day.    FH:  No GI cancers.   GI Hx: 11/2022- Still having non bloody diarrhea (up to 4-5x/day) and is taking antidiarrheals PRN to perform her normal daily lifestyle. Is taking alot of supplements/vitamins. No NSAIDs. 12/16/22- Has slight resolution of diarrhea with metamucil and cholestyramine. Taking metamucil gummies once a day. Pending: B12, folate, Fe, TIBC, Ferritin, Vit D, Consider MRE/CTE/VCE. Crohns vs. Microscopic colitis might be partially treated given on remicade for psoriatic arthritis. 1/11/24- responded well to cholestyramine but did not tolerated the granules. Is asking for the pill version because she had good response. Still having diarrhea. Reviewed recent labs including the ones ordered from Dr. Simeon.  EGD: 2015- Dr. Travis- gastritis (bx from antrum/body/fundus: chronic active gastritis) 2015- Dr. Travis- congested erythematous and nodular mucosa of gastric body and antrum (bx: moderate chronic inflammation with focal ulceration and ischemic change) 2016- Dr. Travis- irregular stomach lining (bx distal and proximal- chronic gastirtis) 2017- Dr. Travis- gastritis (bx: non specific infl). EGD 1/4/22- Dr. Travis- z line irregular (bx: minimal chronic inflam, neg IM/dys), mild schatskis ring DIL to 45 Fr, gastritis (bx: mild acute on chronic infl, neg HP), small HH, normal duodenum EGD/2024-duodenal biopsy negative stomach biopsy chronic inactive gastritis, changes suggestive of treated H. pylori. Esophageal biopsy with reflux type changes.  EUS: 2015- Dr. Travis- normal.  Colonoscopy: 2012- Dr. Travis- IH, random colon bx consistent with collagneous colitis. 2017- Dr. Travis- IH, random colon bx normal. 3/2020- Dr. Travis- EH, IH, sigmoid and desc divertic, random colon bx normal. 8/16/22- Dr. Travis- 3mm polyp in desc (bx: HP), random colon bx normal. IH.  Imaging/Studies/Procedures: 6/2022- c diff negative, elastase 771, O/P normal, TTG normal. TSH normal. 11/10/22- celiac test neg, calprotectin 60.8 (borderline high), crypto (cancelled- wrong container). MRE 1/9/23- normal. 11/2023- CBC (Hgb 11.6, plt 509), CMP, B12, folate, Vit D normal. Quantiferon indeterminate. Fe, TIBC, Ferritin, 11/21/23- infliximab level 1.6, Ab high* 1/302025 - LFTs, CBC wnl.   Interim visit 3/10/25  Pt still having usually a loose stool after coffee. Rest of day, may or may not have a couple more stools and still a little loose.  No noctural diarrhea. No blood in stools, melena. No unientional weight loss.  No severe abd pain. No n/v. Would take a scoop of Benefiber at night. Increase colestipol to 2 pils q hs.  If not improved, will recheck a pancreatic elastase - last checked 3 yrs ago. She has been off all biologics since 11-12/25.  Interim visit 4/29/25 She has been found to have H pylori, thus will require tx.  Will send Rx over for metronidazole, doxy, bismuth, and omeprazole x 14 days. Diarrhea did not improve on colestipol or Benefiber.  Will check a fecal meng and a pancreatic eleastase.  Has psoriatic arthritis.  Has 2 loose stools in am, occ has a couple later in day, but this is rare. No blood, no mucus. No abdominal pain, n/v.   Takes pepcid for some indigestion and helps. RTC in 4 weeks, then will order stool ag for H. pylori.  Interim visit 7/1/25 She is here to r/u on recent stool studies.   H pylori was neg. Fecal meng was elevated in the 130s.  She has hx of collangenous colitis.  In light of diarrhea, will begin a taper of budesonide and hope that it clears her diarrhea.  Having ~2-3 loose stools in morning, rest of day ok.  SHe has been off biologics for psoriatic arthritis since 11/12 2025.   Takes Pepcid for indigestion which is pretty mild. No dysphagia.  No epigastric pain.  RTC 3 months or as needed.